# Patient Record
Sex: FEMALE | Race: WHITE
[De-identification: names, ages, dates, MRNs, and addresses within clinical notes are randomized per-mention and may not be internally consistent; named-entity substitution may affect disease eponyms.]

---

## 2018-01-20 NOTE — EKG
Date Performed: 01/19/2018       Time Performed: 13:35:14

 

PTAGE:      62 years

 

EKG:      Sinus rhythm 

 

. Normal ECG 

 

NO PREVIOUS TRACING            

 

DOCTOR:   Rainer Hussein  Interpretating Date/Time  01/20/2018 13:00:20

## 2018-02-01 ENCOUNTER — HOSPITAL ENCOUNTER (OUTPATIENT)
Dept: HOSPITAL 17 - HSDC | Age: 63
Setting detail: OBSERVATION
LOS: 1 days | Discharge: HOME | End: 2018-02-02
Attending: OBSTETRICS & GYNECOLOGY | Admitting: OBSTETRICS & GYNECOLOGY
Payer: COMMERCIAL

## 2018-02-01 VITALS
TEMPERATURE: 98.2 F | OXYGEN SATURATION: 97 % | RESPIRATION RATE: 20 BRPM | HEART RATE: 84 BPM | DIASTOLIC BLOOD PRESSURE: 72 MMHG | SYSTOLIC BLOOD PRESSURE: 141 MMHG

## 2018-02-01 VITALS
OXYGEN SATURATION: 90 % | TEMPERATURE: 97.2 F | DIASTOLIC BLOOD PRESSURE: 71 MMHG | RESPIRATION RATE: 18 BRPM | SYSTOLIC BLOOD PRESSURE: 147 MMHG | HEART RATE: 90 BPM

## 2018-02-01 VITALS
SYSTOLIC BLOOD PRESSURE: 139 MMHG | HEART RATE: 82 BPM | OXYGEN SATURATION: 98 % | TEMPERATURE: 97.9 F | DIASTOLIC BLOOD PRESSURE: 92 MMHG | RESPIRATION RATE: 18 BRPM

## 2018-02-01 VITALS — HEIGHT: 60 IN | BODY MASS INDEX: 34.5 KG/M2 | WEIGHT: 175.71 LBS

## 2018-02-01 DIAGNOSIS — C54.1: Primary | ICD-10-CM

## 2018-02-01 PROCEDURE — 00840 ANES IPER PX LOWER ABD NOS: CPT

## 2018-02-01 PROCEDURE — 96374 THER/PROPH/DIAG INJ IV PUSH: CPT

## 2018-02-01 PROCEDURE — 96361 HYDRATE IV INFUSION ADD-ON: CPT

## 2018-02-01 PROCEDURE — 86900 BLOOD TYPING SEROLOGIC ABO: CPT

## 2018-02-01 PROCEDURE — 88329 PATH CONSLTJ DRG SURG: CPT

## 2018-02-01 PROCEDURE — 85025 COMPLETE CBC W/AUTO DIFF WBC: CPT

## 2018-02-01 PROCEDURE — 88112 CYTOPATH CELL ENHANCE TECH: CPT

## 2018-02-01 PROCEDURE — 88331 PATH CONSLTJ SURG 1 BLK 1SPC: CPT

## 2018-02-01 PROCEDURE — 88309 TISSUE EXAM BY PATHOLOGIST: CPT

## 2018-02-01 PROCEDURE — 86850 RBC ANTIBODY SCREEN: CPT

## 2018-02-01 PROCEDURE — 58552 LAPARO-VAG HYST INCL T/O: CPT

## 2018-02-01 PROCEDURE — 86901 BLOOD TYPING SEROLOGIC RH(D): CPT

## 2018-02-01 PROCEDURE — 94150 VITAL CAPACITY TEST: CPT

## 2018-02-01 PROCEDURE — G0378 HOSPITAL OBSERVATION PER HR: HCPCS

## 2018-02-01 PROCEDURE — 88307 TISSUE EXAM BY PATHOLOGIST: CPT

## 2018-02-01 PROCEDURE — 80048 BASIC METABOLIC PNL TOTAL CA: CPT

## 2018-02-01 PROCEDURE — 96375 TX/PRO/DX INJ NEW DRUG ADDON: CPT

## 2018-02-01 PROCEDURE — 96376 TX/PRO/DX INJ SAME DRUG ADON: CPT

## 2018-02-01 PROCEDURE — 96372 THER/PROPH/DIAG INJ SC/IM: CPT

## 2018-02-01 RX ADMIN — Medication SCH ML: at 10:45

## 2018-02-01 RX ADMIN — OXYCODONE HYDROCHLORIDE AND ACETAMINOPHEN PRN TAB: 5; 325 TABLET ORAL at 17:34

## 2018-02-01 RX ADMIN — ONDANSETRON PRN MG: 2 INJECTION, SOLUTION INTRAMUSCULAR; INTRAVENOUS at 18:04

## 2018-02-01 RX ADMIN — POTASSIUM CHLORIDE, DEXTROSE MONOHYDRATE AND SODIUM CHLORIDE SCH MLS/HR: 150; 5; 450 INJECTION, SOLUTION INTRAVENOUS at 20:25

## 2018-02-01 RX ADMIN — CLONIDINE HYDROCHLORIDE SCH MG: 0.1 INJECTION, SOLUTION EPIDURAL at 17:29

## 2018-02-01 RX ADMIN — CLONIDINE HYDROCHLORIDE SCH MG: 0.1 INJECTION, SOLUTION EPIDURAL at 11:15

## 2018-02-01 RX ADMIN — HEPARIN SODIUM SCH UNITS: 10000 INJECTION, SOLUTION INTRAVENOUS; SUBCUTANEOUS at 14:11

## 2018-02-01 RX ADMIN — OXYCODONE HYDROCHLORIDE AND ACETAMINOPHEN PRN TAB: 5; 325 TABLET ORAL at 22:40

## 2018-02-01 RX ADMIN — Medication SCH ML: at 20:26

## 2018-02-01 RX ADMIN — POTASSIUM CHLORIDE, DEXTROSE MONOHYDRATE AND SODIUM CHLORIDE SCH MLS/HR: 150; 5; 450 INJECTION, SOLUTION INTRAVENOUS at 11:15

## 2018-02-01 RX ADMIN — HEPARIN SODIUM SCH UNITS: 10000 INJECTION, SOLUTION INTRAVENOUS; SUBCUTANEOUS at 22:31

## 2018-02-01 RX ADMIN — OXYCODONE HYDROCHLORIDE AND ACETAMINOPHEN PRN TAB: 5; 325 TABLET ORAL at 12:12

## 2018-02-01 RX ADMIN — CLONIDINE HYDROCHLORIDE SCH MG: 0.1 INJECTION, SOLUTION EPIDURAL at 22:32

## 2018-02-01 NOTE — PD.ONC.PN
Subjective


Subjective


Remarks


post op note:





patient is resting in bed 


denies nausea and vomiting


has taken Percocet for pain





Objective


Data











  Date Time  Temp Pulse Resp B/P (MAP) Pulse Ox O2 Delivery O2 Flow Rate FiO2


 


2/1/18 14:13   16     


 


2/1/18 13:32 97.9 82 18 139/92 (108) 98   


 


2/1/18 12:30  78 16 111/65 (80) 97 Nasal Cannula 2 


 


2/1/18 12:00  80 16 105/57 (73) 96 Nasal Cannula 2 


 


2/1/18 11:45  80 16 112/64 (80) 96 Nasal Cannula 2 


 


2/1/18 11:30  82 16 121/63 (82) 95 Nasal Cannula 2 


 


2/1/18 11:15  80 16 135/68 (90) 96 Nasal Cannula 2 


 


2/1/18 11:00  82 16 139/68 (91) 96 Nasal Cannula 2 


 


2/1/18 10:45  90 16 120/69 (86) 94 Nasal Cannula 2 


 


2/1/18 10:39 98.3 88 16 139/74 (95) 94 Nasal Cannula 2 


 


2/1/18 06:22 98.8 74 18 126/68 (87) 95   














 2/1/18 2/1/18 2/1/18





 07:00 15:00 23:00


 


Intake Total  1200 ml 


 


Output Total  260 ml 


 


Balance  940 ml 











Administered Medications








 Medications


  (Trade)  Dose


 Ordered  Sig/Lizzie


 Route


 PRN Reason  Start Time


 Stop Time Status Last Admin


Dose Admin


 


 Lactated Ringer's  1,000 ml @ 


 30 mls/hr  Q24H PRN


 IV


 SEE LABEL COMMENTS  2/1/18 06:00


 2/4/18 05:59  2/1/18 06:30


 


 


 Povidone Iodine


  (Betadine 5%


 Antisepsis Kit)  1 applic  ON CALL  PRN


 EACH NARE


 SEE LABEL COMMENTS  2/1/18 06:00


 2/4/18 05:59  2/1/18 06:42


 


 


 Chlorhexidine


 Gluconate


  (Chlorhexidine


 2% Cloth)  3 pack  ON CALL  PRN


 TOPICAL


 SEE LABEL COMMENTS  2/1/18 06:00


 2/4/18 05:59  2/1/18 06:00


 


 


 Heparin Sodium


  (Porcine)


  (Heparin Inj)  5,000 units  ON CALL  PRN


 SQ


 GIVE ON CALL TO OR  2/1/18 06:00


    2/1/18 06:35


 


 


 Sodium Chloride


  (NS Flush)  2 ml  BID


 IV FLUSH


   2/1/18 09:00


    2/1/18 10:45


 


 


 Potassium


 Chloride/Dextrose/


 Sod Cl  1,000 ml @ 


 100 mls/hr  Q10H


 IV


   2/1/18 10:31


    2/1/18 11:15


 


 


 Ketorolac


 Tromethamine


  (Toradol Inj)  15 mg  Q6H


 IVP


   2/1/18 11:00


 2/2/18 05:01  2/1/18 11:15


 


 


 Oxycodone/


 Acetaminophen


  (Percocet  5-325


 Mg)  1 tab  Q4H  PRN


 PO


 PAIN SCALE 1 TO 5  2/1/18 10:45


    2/1/18 12:12


 


 


 Heparin Sodium


  (Porcine)


  (Heparin Inj)  5,000 units  Q8H


 SQ


   2/1/18 15:00


    2/1/18 14:11


 








Objective Remarks


GENERAL: Well-nourished, well-developed patient.


SKIN: Warm and dry.


HEAD: Normocephalic., mild facial swelling


EYES: No scleral icterus. No injection or drainage. 


CARDIOVASCULAR: Regular rate and rhythm without murmurs. 


RESPIRATORY: Breath sounds equal bilaterally. No accessory muscle use.


GASTROINTESTINAL: SS are c/d/i 


EXTREMITIES: No cyanosis, or edema. 


MUSCULOSKELETAL: Adequate muscle tone.


NEUROLOGICAL: No obvious focal deficit. Awake, alert, and oriented x3.


PSYCHIATRIC: Appropriate mood and affect; insight and judgment normal.





Assessment/Plan


Problem List:  


(1) Post-operative state


ICD Codes:  Z98.890 - Other specified postprocedural states


Plan:  s/p RA lap hyst with bso for endometrial cancer





post op orders in chart


OOB to chair


IS to bedside


ADAT


Percocet and Toradol for pain


solis to be discontinued in the morning and 


anticipate discharge in next 24 hours














Mar Henriquez Feb 1, 2018 15:19

## 2018-02-02 VITALS
SYSTOLIC BLOOD PRESSURE: 124 MMHG | RESPIRATION RATE: 16 BRPM | TEMPERATURE: 97.3 F | OXYGEN SATURATION: 93 % | HEART RATE: 74 BPM | DIASTOLIC BLOOD PRESSURE: 61 MMHG

## 2018-02-02 VITALS
HEART RATE: 80 BPM | SYSTOLIC BLOOD PRESSURE: 125 MMHG | RESPIRATION RATE: 20 BRPM | TEMPERATURE: 96.3 F | OXYGEN SATURATION: 92 % | DIASTOLIC BLOOD PRESSURE: 62 MMHG

## 2018-02-02 VITALS
RESPIRATION RATE: 20 BRPM | HEART RATE: 80 BPM | TEMPERATURE: 97.4 F | SYSTOLIC BLOOD PRESSURE: 147 MMHG | DIASTOLIC BLOOD PRESSURE: 67 MMHG | OXYGEN SATURATION: 92 %

## 2018-02-02 LAB
BASOPHILS # BLD AUTO: 0 TH/MM3 (ref 0–0.2)
BASOPHILS NFR BLD: 0.1 % (ref 0–2)
BUN SERPL-MCNC: 12 MG/DL (ref 7–18)
CALCIUM SERPL-MCNC: 8.7 MG/DL (ref 8.5–10.1)
CHLORIDE SERPL-SCNC: 103 MEQ/L (ref 98–107)
CREAT SERPL-MCNC: 0.73 MG/DL (ref 0.5–1)
EOSINOPHIL # BLD: 0 TH/MM3 (ref 0–0.4)
EOSINOPHIL NFR BLD: 0 % (ref 0–4)
ERYTHROCYTE [DISTWIDTH] IN BLOOD BY AUTOMATED COUNT: 13.1 % (ref 11.6–17.2)
GFR SERPLBLD BASED ON 1.73 SQ M-ARVRAT: 81 ML/MIN (ref 89–?)
GLUCOSE SERPL-MCNC: 109 MG/DL (ref 74–106)
HCO3 BLD-SCNC: 24.4 MEQ/L (ref 21–32)
HCT VFR BLD CALC: 42.2 % (ref 35–46)
HGB BLD-MCNC: 14.2 GM/DL (ref 11.6–15.3)
LYMPHOCYTES # BLD AUTO: 1.1 TH/MM3 (ref 1–4.8)
LYMPHOCYTES NFR BLD AUTO: 7.3 % (ref 9–44)
MCH RBC QN AUTO: 31.8 PG (ref 27–34)
MCHC RBC AUTO-ENTMCNC: 33.7 % (ref 32–36)
MCV RBC AUTO: 94.5 FL (ref 80–100)
MONOCYTE #: 0.6 TH/MM3 (ref 0–0.9)
MONOCYTES NFR BLD: 4.4 % (ref 0–8)
NEUTROPHILS # BLD AUTO: 12.9 TH/MM3 (ref 1.8–7.7)
NEUTROPHILS NFR BLD AUTO: 88.2 % (ref 16–70)
PLATELET # BLD: 235 TH/MM3 (ref 150–450)
PMV BLD AUTO: 8.5 FL (ref 7–11)
RBC # BLD AUTO: 4.47 MIL/MM3 (ref 4–5.3)
SODIUM SERPL-SCNC: 137 MEQ/L (ref 136–145)
WBC # BLD AUTO: 14.6 TH/MM3 (ref 4–11)

## 2018-02-02 RX ADMIN — OXYCODONE HYDROCHLORIDE AND ACETAMINOPHEN PRN TAB: 5; 325 TABLET ORAL at 10:44

## 2018-02-02 RX ADMIN — HEPARIN SODIUM SCH UNITS: 10000 INJECTION, SOLUTION INTRAVENOUS; SUBCUTANEOUS at 06:04

## 2018-02-02 RX ADMIN — OXYCODONE HYDROCHLORIDE AND ACETAMINOPHEN PRN TAB: 5; 325 TABLET ORAL at 06:20

## 2018-02-02 RX ADMIN — CLONIDINE HYDROCHLORIDE SCH MG: 0.1 INJECTION, SOLUTION EPIDURAL at 06:04

## 2018-02-02 RX ADMIN — ONDANSETRON PRN MG: 2 INJECTION, SOLUTION INTRAMUSCULAR; INTRAVENOUS at 06:04

## 2018-02-02 RX ADMIN — POTASSIUM CHLORIDE, DEXTROSE MONOHYDRATE AND SODIUM CHLORIDE SCH MLS/HR: 150; 5; 450 INJECTION, SOLUTION INTRAVENOUS at 06:04

## 2018-02-02 RX ADMIN — Medication SCH ML: at 09:00

## 2018-02-02 NOTE — PD.ONC.PN
Subjective


Subjective


Remarks


Late note entry


Patient seen at bedside at 9 AM.  


She is s/p surgery. 


Nausea improved this morning.





Objective


Data











  Date Time  Temp Pulse Resp B/P (MAP) Pulse Ox O2 Delivery O2 Flow Rate FiO2


 


2/2/18 08:00 97.3 74 16 124/61 (82) 93   


 


2/2/18 06:09 96.3 80 20 125/62 (83) 92   


 


2/2/18 00:00 97.4 80 20 147/67 (93) 92   


 


2/1/18 20:00 98.2 84 20 141/72 (95) 97   














 2/2/18 2/2/18 2/2/18





 07:00 15:00 23:00


 


Intake Total 1000 ml 300 ml 


 


Output Total 1200 ml 300 ml 


 


Balance -200 ml 0 ml 








Result Diagram:  


2/2/18 0350                                                                    

            2/2/18 0350





Laboratory Results





Laboratory Tests








Test


  2/2/18


03:50


 


White Blood Count 14.6 TH/MM3 


 


Red Blood Count 4.47 MIL/MM3 


 


Hemoglobin 14.2 GM/DL 


 


Hematocrit 42.2 % 


 


Mean Corpuscular Volume 94.5 FL 


 


Mean Corpuscular Hemoglobin 31.8 PG 


 


Mean Corpuscular Hemoglobin


Concent 33.7 % 


 


 


Red Cell Distribution Width 13.1 % 


 


Platelet Count 235 TH/MM3 


 


Mean Platelet Volume 8.5 FL 


 


Neutrophils (%) (Auto) 88.2 % 


 


Lymphocytes (%) (Auto) 7.3 % 


 


Monocytes (%) (Auto) 4.4 % 


 


Eosinophils (%) (Auto) 0.0 % 


 


Basophils (%) (Auto) 0.1 % 


 


Neutrophils # (Auto) 12.9 TH/MM3 


 


Lymphocytes # (Auto) 1.1 TH/MM3 


 


Monocytes # (Auto) 0.6 TH/MM3 


 


Eosinophils # (Auto) 0.0 TH/MM3 


 


Basophils # (Auto) 0.0 TH/MM3 


 


CBC Comment DIFF FINAL 


 


Differential Comment  


 


Blood Urea Nitrogen 12 MG/DL 


 


Creatinine 0.73 MG/DL 


 


Random Glucose 109 MG/DL 


 


Calcium Level 8.7 MG/DL 


 


Sodium Level 137 MEQ/L 


 


Potassium Level 4.8 MEQ/L 


 


Chloride Level 103 MEQ/L 


 


Carbon Dioxide Level 24.4 MEQ/L 


 


Anion Gap 10 MEQ/L 


 


Estimat Glomerular Filtration


Rate 81 ML/MIN 


 








Objective Remarks


GENERAL: Well-nourished, well-developed patient.


RESPIRATORY:  No accessory muscle use.


GASTROINTESTINAL: Abdomen soft, non-tender, nondistended. 


NEUROLOGICAL: No obvious focal deficit. Awake, alert, and oriented x3.


PSYCHIATRIC: Appropriate mood and affect; insight and judgment normal.





Assessment/Plan


Assessment


1.  History of PE and now s/p hysterectomy: will continue with aggressive VTE 

ppx.  She will go home with lovenox 40 mg subq daily x 7 days.  Creatinine 

clearance greater than 30.











Diana Haque MD Feb 2, 2018 20:03

## 2018-02-02 NOTE — MP
cc:

MILAGROS FRENCH MD,LACY MARTIN,KIMMIE MILNER M.D.

****

 

 

DATE OF SURGERY

2/1/2018

 

PREOPERATIVE DIAGNOSIS

Grade 2 endometrial adenocarcinoma.

 

POSTOPERATIVE DIAGNOSIS

Grade 2 endometrial adenocarcinoma.

 

PROCEDURE

Robotic-assisted laparoscopic hysterectomy, bilateral salpingo-oophorectomy.

 

SURGEON

Lacy Sanchez.

 

FIRST ASSISTANT

[place] First Assistant.

 

ANESTHESIA

General endotracheal.

 

ESTIMATED BLOOD LOSS

100 cc.

 

IV FLUIDS

1100 cc.

 

URINE OUTPUT

50 cc.

 

HISTORY

This is a 62-year-old female with postmenopausal bleeding, thickened

endometrial stripe.  Biopsy showed grade 2 endometrial adenocarcinoma.

 

She was counseled regarding options.  She is in favor of definitive surgical

management.  She is seen again in the pre-op  holding area with her family

members where the findings are again reviewed and the plan of care is

discussed.  Questions were asked and answered.  She expressed good

understanding and agrees to move forward with surgery.

 

FINDINGS

Uterine cavity sounded to 6 cm.  Grossly the uterus, tubes and ovaries appeared

normal.  There are no appreciably enlarged pelvic or paraaortic lymph nodes.

 

In the peritoneal cavity the liver and diaphragm edges are smooth.  The

omentum, large and small bowel and adjacent mesentery are grossly normal with

no peritoneal implants.

 

The uterus once removed showed a tumor to be approximately 2.5 cm in greatest

dimension.  It was superficial and polypoid and there was no overt evidence of

myometrial invasion or cervical extension.

 

DETAILS OF PROCEDURE

She was taken to the operating room, placed in dorsal lithotomy position.

After general endotracheal anesthesia was administered a timeout was

undertaken.  She was identified by sight recognition and hospital ID bracelet

and the proposed procedure was reviewed and confirmed.  She was carefully

positioned in padded Candido stirrups.  Her arms were padded and secured to the

sides.  She was further secured to the operating table with eggcrate padding

and tape in an across chest over the shoulder fashion.  All sites were noted to

be properly aligned with no malalignments or pressure points.

 

She was prepped and draped in sterile fashion, placed in lithotomy position.

The cervix was grasped.  The uterine cavity was sounded.  The cervix was

dilated.  A small VCare manipulator was inserted and secured in the usual

fashion.  A Rice catheter was placed in the bladder.  She was returned to low

lithotomy position.  A change of sterile gloves was undertaken and we completed

draping in anticipation of laparoscopy and confirmed that an orogastric tube

was in the stomach and on suction.

 

With manual elevation of the abdominal wall and direct laparoscopic

visualization a 5 mm cannula was introduced into the left upper quadrant.

Carbon dioxide gas was insufflated and an atraumatic entry was confirmed.

 

Under laparoscopic visualization a 12 mm cannula was placed in the midline

above the umbilicus.  An 8 mm cannula was placed in the right upper quadrant

and the left lateral quadrant and the original 5 was exchanged for an 8 mm

cannula.

 

She was placed in Trendelenburg position.  Peritoneal washings were obtained

for cytology.  The anatomy was surveyed with findings as described above.  The

small bowel was folded back on its mesenteric root to the extent possible and

three Ray-Leta sponges were placed around the root of the small bowel

mesentery.

 

The robotic system was brought into the operative field and attached in the

usual fashion.  Monopolar scissors, fenestrated bipolar forceps and ProGrasp

manipulators were placed in arms #1, 2 and 3 respectively and I took my place

at the surgeon's console.

 

The right round ligament was isolated, cauterized and transected.  The anterior

and posterior leafs of the broad ligament were opened.  The right ureter was

identified.  The right infundibulopelvic ligament was isolated.  The

intervening peritoneum was opened.  The infundibulopelvic ligament was elevated

as it was dissected proximally and the ureter was retracted posteriorly.  The

infundibulopelvic ligament was thoroughly cauterized and transected.

 

The posterior peritoneum was opened along the right side of uterus and cervix.

The right vesicouterine peritoneum was dissected off the lower uterine segment

and cervix.  The right uterine vessels were skeletonized, cauterized and

transected as were the cardinal, paracervical and uterosacral ligaments.

 

Attention was directed toward the left side.  Some lysis of adhesions was

performed to mobilize the colon from its attachments to the left pelvic

sidewall.  The left round ligament was isolated, cauterized and transected, and

the anterior and posterior leafs of the broad ligament were further dissected.

 

The left ureter was identified.  The left infundibulopelvic ligament was

isolated.  The intervening peritoneum was opened.  The infundibulopelvic

ligament was dissected to the pelvic brim where it was cauterized and

transected.

 

The posterior peritoneum was opened along the left side of the uterus and

cervix and the left vesicouterine peritoneum dissected off the lower uterine

segment and cervix.  The left uterine vessels were skeletonized, cauterized and

transected as were the cardinal, paracervical and uterosacral ligaments.

 

Colpotomy was performed  the cervix from the upper vagina.  Specimen

was withdrawn transvaginally which included uterus, cervix, tubes and ovaries,

and sent for initial histopathologic analysis.

 

Instruments 1 and 3 were exchanged for needle drivers as 0 Vicryl suture was

introduced.  The vaginal cuff was closed starting at the left corner

full-thickness closure including the posterior peritoneum edge of the

uterosacral ligament tied via instrument tie.  The closure was held on

countertraction as a running continuous full-thickness closure was carried

across the vaginal apex to the contralateral corner where it was similarly

fixed and tied securely.  The needle was cut and removed.

 

The pelvis was thoroughly irrigated.  Small bleeders were rendered hemostatic

with bipolar cautery.  The integrity of the bladder was confirmed by filling

the bladder with saline dyed with methylene blue, distended nicely under

pressure.  There were no weak spots in the bladder, no blue visible, no

extravasation of dye.  There was a good margin between the bladder edge and the

vaginal cuff suture line.  There was good peristalsis of ureters bilaterally

and the bladder was drained.

 

Pathology came back showing a noninvasive tumor.  It was felt that staging

lymphadenectomy would be associated with morbidity that exceeded benefit.  It

was felt that all reasonable surgical objectives had been completed.

 

The robotic instruments were removed.  The robotic system was disengaged from

the operative field.  I reentered the bedside under sterile condition.  Each of

the three Ray-Leta sponges that had been placed were removed laparoscopically.

They were removed through the 12 mm cannula.  Each were inspected and noted to

be removed in their entirety.

 

The 12 mm fascial defect was then closed with interrupted 0 Vicryl sutures

using a needle pass fascial closure apparatus.  They were tied securely which

rendered the fascia completely airtight and hemostatic.

 

The remaining cannulas were withdrawn.  Carbon dioxide gas was removed from the

peritoneal cavity.  3-0 Vicryl subcutaneous, 3-0 Vicryl subcuticular and

Steri-Strips were used to close these incisions.

 

She was returned to dorsal lithotomy position.  Pelvic exam confirmed the

vaginal cuff was well-supported, the suture line was intact.  There were no

vaginal lacerations except one superficial laceration right at the introitus

which was repaired and rendered hemostatic with interrupted 3-0 Vicryl

figure-of-eight sutures.

 

There were no remaining foreign objects in the vagin.  All sites were

hemostatic.  Final counts were correct.  She was returned to dorsal supine

position and was pending reversal of anesthesia when I left the operating room

to precede her to the post-anesthesia care unit.

 

 

                              _________________________________

                              MD AALIYAH Kothari/CARLTON

D:  2/1/2018/11:31 AM

T:  2/2/2018/11:46 AM

Visit #:  [id number]

Job #:  [id number]

## 2018-02-03 ENCOUNTER — HOSPITAL ENCOUNTER (OUTPATIENT)
Dept: HOSPITAL 17 - NEPE | Age: 63
Setting detail: OBSERVATION
LOS: 2 days | Discharge: HOME | End: 2018-02-05
Payer: COMMERCIAL

## 2018-02-03 VITALS
SYSTOLIC BLOOD PRESSURE: 121 MMHG | RESPIRATION RATE: 18 BRPM | OXYGEN SATURATION: 95 % | HEART RATE: 74 BPM | DIASTOLIC BLOOD PRESSURE: 79 MMHG

## 2018-02-03 VITALS
SYSTOLIC BLOOD PRESSURE: 136 MMHG | RESPIRATION RATE: 16 BRPM | TEMPERATURE: 99.2 F | OXYGEN SATURATION: 98 % | HEART RATE: 88 BPM | DIASTOLIC BLOOD PRESSURE: 69 MMHG

## 2018-02-03 VITALS
RESPIRATION RATE: 16 BRPM | SYSTOLIC BLOOD PRESSURE: 147 MMHG | DIASTOLIC BLOOD PRESSURE: 70 MMHG | OXYGEN SATURATION: 92 % | HEART RATE: 87 BPM

## 2018-02-03 VITALS
RESPIRATION RATE: 17 BRPM | SYSTOLIC BLOOD PRESSURE: 135 MMHG | TEMPERATURE: 99 F | DIASTOLIC BLOOD PRESSURE: 70 MMHG | HEART RATE: 81 BPM | OXYGEN SATURATION: 95 %

## 2018-02-03 VITALS
HEART RATE: 88 BPM | DIASTOLIC BLOOD PRESSURE: 70 MMHG | RESPIRATION RATE: 18 BRPM | SYSTOLIC BLOOD PRESSURE: 118 MMHG | TEMPERATURE: 99.2 F | OXYGEN SATURATION: 94 %

## 2018-02-03 VITALS — BODY MASS INDEX: 33.98 KG/M2 | WEIGHT: 173.06 LBS | HEIGHT: 60 IN

## 2018-02-03 VITALS — OXYGEN SATURATION: 95 %

## 2018-02-03 DIAGNOSIS — Z79.01: ICD-10-CM

## 2018-02-03 DIAGNOSIS — R11.0: ICD-10-CM

## 2018-02-03 DIAGNOSIS — E11.9: ICD-10-CM

## 2018-02-03 DIAGNOSIS — M19.90: ICD-10-CM

## 2018-02-03 DIAGNOSIS — I10: ICD-10-CM

## 2018-02-03 DIAGNOSIS — R50.82: Primary | ICD-10-CM

## 2018-02-03 DIAGNOSIS — F32.9: ICD-10-CM

## 2018-02-03 DIAGNOSIS — R09.02: ICD-10-CM

## 2018-02-03 DIAGNOSIS — Z86.711: ICD-10-CM

## 2018-02-03 DIAGNOSIS — F41.9: ICD-10-CM

## 2018-02-03 DIAGNOSIS — Z85.42: ICD-10-CM

## 2018-02-03 DIAGNOSIS — R10.9: ICD-10-CM

## 2018-02-03 DIAGNOSIS — Z79.899: ICD-10-CM

## 2018-02-03 DIAGNOSIS — Z90.710: ICD-10-CM

## 2018-02-03 DIAGNOSIS — K76.0: ICD-10-CM

## 2018-02-03 LAB
ALBUMIN SERPL-MCNC: 3.3 GM/DL (ref 3.4–5)
ALP SERPL-CCNC: 81 U/L (ref 45–117)
ALT SERPL-CCNC: 53 U/L (ref 10–53)
AST SERPL-CCNC: 26 U/L (ref 15–37)
BACTERIA #/AREA URNS HPF: (no result) /HPF
BASOPHILS # BLD AUTO: 0.1 TH/MM3 (ref 0–0.2)
BASOPHILS NFR BLD: 0.5 % (ref 0–2)
BILIRUB SERPL-MCNC: 0.4 MG/DL (ref 0.2–1)
BUN SERPL-MCNC: 13 MG/DL (ref 7–18)
CALCIUM SERPL-MCNC: 8.7 MG/DL (ref 8.5–10.1)
CHLORIDE SERPL-SCNC: 101 MEQ/L (ref 98–107)
COLOR UR: YELLOW
CREAT SERPL-MCNC: 0.77 MG/DL (ref 0.5–1)
EOSINOPHIL # BLD: 0.2 TH/MM3 (ref 0–0.4)
EOSINOPHIL NFR BLD: 1.5 % (ref 0–4)
ERYTHROCYTE [DISTWIDTH] IN BLOOD BY AUTOMATED COUNT: 13.3 % (ref 11.6–17.2)
GFR SERPLBLD BASED ON 1.73 SQ M-ARVRAT: 76 ML/MIN (ref 89–?)
GLUCOSE SERPL-MCNC: 99 MG/DL (ref 74–106)
GLUCOSE UR STRIP-MCNC: (no result) MG/DL
HCO3 BLD-SCNC: 32 MEQ/L (ref 21–32)
HCT VFR BLD CALC: 40 % (ref 35–46)
HGB BLD-MCNC: 13.6 GM/DL (ref 11.6–15.3)
HGB UR QL STRIP: (no result)
INR PPP: 1 RATIO
KETONES UR STRIP-MCNC: (no result) MG/DL
LYMPHOCYTES # BLD AUTO: 2 TH/MM3 (ref 1–4.8)
LYMPHOCYTES NFR BLD AUTO: 17 % (ref 9–44)
MCH RBC QN AUTO: 32.1 PG (ref 27–34)
MCHC RBC AUTO-ENTMCNC: 34 % (ref 32–36)
MCV RBC AUTO: 94.5 FL (ref 80–100)
MONOCYTE #: 1 TH/MM3 (ref 0–0.9)
MONOCYTES NFR BLD: 8.9 % (ref 0–8)
MUCOUS THREADS #/AREA URNS LPF: (no result) /LPF
NEUTROPHILS # BLD AUTO: 8.5 TH/MM3 (ref 1.8–7.7)
NEUTROPHILS NFR BLD AUTO: 72.1 % (ref 16–70)
NITRITE UR QL STRIP: (no result)
PLATELET # BLD: 291 TH/MM3 (ref 150–450)
PMV BLD AUTO: 7.9 FL (ref 7–11)
PROT SERPL-MCNC: 7.2 GM/DL (ref 6.4–8.2)
PROTHROMBIN TIME: 10.1 SEC (ref 9.8–11.6)
RBC # BLD AUTO: 4.23 MIL/MM3 (ref 4–5.3)
SODIUM SERPL-SCNC: 138 MEQ/L (ref 136–145)
SP GR UR STRIP: 1.02 (ref 1–1.03)
SQUAMOUS #/AREA URNS HPF: 1 /HPF (ref 0–5)
URINE LEUKOCYTE ESTERASE: (no result)
WBC # BLD AUTO: 11.7 TH/MM3 (ref 4–11)

## 2018-02-03 PROCEDURE — 94664 DEMO&/EVAL PT USE INHALER: CPT

## 2018-02-03 PROCEDURE — A9567 TECHNETIUM TC-99M AEROSOL: HCPCS

## 2018-02-03 PROCEDURE — 99285 EMERGENCY DEPT VISIT HI MDM: CPT

## 2018-02-03 PROCEDURE — 87804 INFLUENZA ASSAY W/OPTIC: CPT

## 2018-02-03 PROCEDURE — 96361 HYDRATE IV INFUSION ADD-ON: CPT

## 2018-02-03 PROCEDURE — 71045 X-RAY EXAM CHEST 1 VIEW: CPT

## 2018-02-03 PROCEDURE — 96366 THER/PROPH/DIAG IV INF ADDON: CPT

## 2018-02-03 PROCEDURE — 85730 THROMBOPLASTIN TIME PARTIAL: CPT

## 2018-02-03 PROCEDURE — 71046 X-RAY EXAM CHEST 2 VIEWS: CPT

## 2018-02-03 PROCEDURE — 97161 PT EVAL LOW COMPLEX 20 MIN: CPT

## 2018-02-03 PROCEDURE — 85610 PROTHROMBIN TIME: CPT

## 2018-02-03 PROCEDURE — 85379 FIBRIN DEGRADATION QUANT: CPT

## 2018-02-03 PROCEDURE — A9540 TC99M MAA: HCPCS

## 2018-02-03 PROCEDURE — 83690 ASSAY OF LIPASE: CPT

## 2018-02-03 PROCEDURE — 94618 PULMONARY STRESS TESTING: CPT

## 2018-02-03 PROCEDURE — 81001 URINALYSIS AUTO W/SCOPE: CPT

## 2018-02-03 PROCEDURE — G0378 HOSPITAL OBSERVATION PER HR: HCPCS

## 2018-02-03 PROCEDURE — 96365 THER/PROPH/DIAG IV INF INIT: CPT

## 2018-02-03 PROCEDURE — 94640 AIRWAY INHALATION TREATMENT: CPT

## 2018-02-03 PROCEDURE — 87040 BLOOD CULTURE FOR BACTERIA: CPT

## 2018-02-03 PROCEDURE — 80053 COMPREHEN METABOLIC PANEL: CPT

## 2018-02-03 PROCEDURE — 87086 URINE CULTURE/COLONY COUNT: CPT

## 2018-02-03 PROCEDURE — 78582 LUNG VENTILAT&PERFUS IMAGING: CPT

## 2018-02-03 PROCEDURE — 74177 CT ABD & PELVIS W/CONTRAST: CPT

## 2018-02-03 PROCEDURE — 85025 COMPLETE CBC W/AUTO DIFF WBC: CPT

## 2018-02-03 PROCEDURE — 96367 TX/PROPH/DG ADDL SEQ IV INF: CPT

## 2018-02-03 PROCEDURE — 94150 VITAL CAPACITY TEST: CPT

## 2018-02-03 PROCEDURE — 83605 ASSAY OF LACTIC ACID: CPT

## 2018-02-03 PROCEDURE — 96372 THER/PROPH/DIAG INJ SC/IM: CPT

## 2018-02-03 PROCEDURE — 96376 TX/PRO/DX INJ SAME DRUG ADON: CPT

## 2018-02-03 PROCEDURE — 96375 TX/PRO/DX INJ NEW DRUG ADDON: CPT

## 2018-02-03 RX ADMIN — Medication SCH ML: at 21:00

## 2018-02-03 RX ADMIN — ENOXAPARIN SODIUM SCH MG: 40 INJECTION SUBCUTANEOUS at 20:53

## 2018-02-03 RX ADMIN — STANDARDIZED SENNA CONCENTRATE AND DOCUSATE SODIUM SCH TAB: 8.6; 5 TABLET, FILM COATED ORAL at 21:02

## 2018-02-03 RX ADMIN — Medication SCH ML: at 21:02

## 2018-02-03 NOTE — RADRPT
EXAM DATE/TIME:  02/03/2018 17:24 

 

[facility] COMPARISON:     

No previous studies available for comparison.

 

                     

INDICATIONS :     

Fever.

                     

 

MEDICAL HISTORY :     

Hypertension.          

 

SURGICAL HISTORY :     

Hysterectomy.   

 

ENCOUNTER:     

Initial                                        

 

ACUITY:     

1 day      

 

PAIN SCORE:     

0/10

 

LOCATION:     

Bilateral chest 

 

FINDINGS:     

Slight bibasilar atelectasis and/or infiltrate is seen. Heart and mediastinum are unremarkable for te
chnique.

 

CONCLUSION:     

Slight bibasilar atelectasis and/or infiltrate is seen worse on the left.

 

 

 

 MARGARITA Rodarte MD on February 03, 2018 at 17:48           

Board Certified Radiologist.

 This report was verified electronically.

## 2018-02-03 NOTE — PD
HPI


Chief Complaint:  Cold / Flu Symptoms


Time Seen by Provider:  17:13


Travel History


International Travel<30 days:  No


Contact w/Intl Traveler<30days:  No


Traveled to known affect area:  No





History of Present Illness


HPI


62 year old female presents to the emergency department for evaluation of 

fever.  Patient has hysterectomy by Dr. Valdes on Thursday, 2 days ago, for 

endometrial cancer.  She states she had a fever today up to 100.9.  She is on 

oxycodone for pain which she last took at 3 PM.  She also is on Lovenox due to 

history of blood clots.  Patient reports abdominal pain, 7/10, sharp and achy 

without radiation.  She denies any cough, congestion.  No chest pain or 

shortness of breath.  She reports nausea, but no vomiting.  She reports gas 

pain and has not had a bowel movement since she had the surgery done.  She is 

not currently undergoing chemotherapy or radiation therapy.  Moderate severity.

  Oxycodone will help alleviate the pain.  No exacerbating factors.





PFSH


Past Medical History


Depression:  Yes


Cancer:  Yes (endometrial)


Cardiovascular Problems:  Yes


Diabetes:  No


Diminished Hearing:  No


Endocrine:  No


Genitourinary:  No


Hepatitis:  No


Hiatal Hernia:  No


Immune Disorder:  No


Musculoskeletal:  Yes (arthritis)


Neurologic:  No


Psychiatric:  Yes (depression)


Reproductive:  Yes (endometrial cancer)


Respiratory:  Yes (prev hx of blood clots in lungs)


Thyroid Disease:  No





Past Surgical History


Abdominal Surgery:  No


AICD:  No


Cardiac Surgery:  No


Ear Surgery:  No


Endocrine Surgery:  No


Eye Surgery:  No


Genitourinary Surgery:  No


Gynecologic Surgery:  No


Joint Replacement:  No


Oral Surgery:  No


Pacemaker:  No


Thoracic Surgery:  No





Social History


Alcohol Use:  Yes (OCCASIONAL DRIKN)


Tobacco Use:  No


Substance Use:  No





Allergies-Medications


(Allergen,Severity, Reaction):  


Coded Allergies:  


     succinylcholine (Verified  Allergy, Severe, has pseudocholinesterase 

deficiency, 2/3/18)


Reported Meds & Prescriptions





Reported Meds & Active Scripts


Active


Oxycodone-Acetaminophen 5-325 (Oxycodone HCl/Acetaminophen) 5 Mg-325 Mg Tablet 

1 Tab PO Q4H PRN


Reported


Vitamin D3 (Cholecalciferol) 5,000 Unit Cap 5,000 Units PO WEEKLY


Fluoxetine (Fluoxetine HCl) 20 Mg Tab 20 Mg PO DAILY


Amlodipine (Amlodipine Besylate) 5 Mg Tab 5 Mg PO DAILY








Review of Systems


Except as stated in HPI:  all other systems reviewed are Neg





Physical Exam


Narrative


GENERAL: Well-nourished, well-developed female patient, ambulatory.  Afebrile.


SKIN: Focused skin assessment warm/dry.


HEAD: Normocephalic.  Atraumatic.


ENT: Mucosa pink and moist. No erythema or exudates. No uvular edema. No uvular

, palatal, or tonsillar deviation. Airway patent. Nasal turbinates appear 

normal without nasal blood, purulent drainage or septal hematoma.  Bilateral 

tympanic membranes are clear without erythema or perforation.


EYES: No scleral icterus. No injection or drainage. 


NECK: Supple, trachea midline. No JVD or lymphadenopathy.


CARDIOVASCULAR: Regular rate and rhythm without murmurs, gallops, or rubs. 


RESPIRATORY: Breath sounds equal bilaterally. No accessory muscle use.  Lungs 

sounds are clear to auscultation.


GASTROINTESTINAL: Abdomen soft and nondistended.  Patient has 4 small incisions 

to abdomen without drainage or erythema.  Steri-Strips are in place.  She she 

has mild diffuse tenderness to palpation.


MUSCULOSKELETAL: No cyanosis, or edema.  


BACK: Nontender without obvious deformity. No CVA tenderness.





Data


Data


Last Documented VS





Vital Signs








  Date Time  Temp Pulse Resp B/P (MAP) Pulse Ox O2 Delivery O2 Flow Rate FiO2


 


2/3/18 18:00  74 18 121/79 (93) 95 Nasal Cannula 2.00 


 


2/3/18 16:59 99.2       








Orders





 Orders


Complete Blood Count With Diff (2/3/18 17:19)


Comprehensive Metabolic Panel (2/3/18 17:19)


Lipase (2/3/18 17:19)


Prothrombin Time / Inr (Pt) (2/3/18 17:19)


Act Partial Throm Time (Ptt) (2/3/18 17:19)


Urinalysis - C+S If Indicated (2/3/18 17:19)


Ct Abd/Pel W Iv Contrast(Rout) (2/3/18 17:19)


Iv Access Insert/Monitor (2/3/18 17:19)


Ecg Monitoring (2/3/18 17:19)


Oximetry (2/3/18 17:19)


Ondansetron Inj (Zofran Inj) (2/3/18 17:30)


Sodium Chlor 0.9% 1000 Ml Inj (Ns 1000 M (2/3/18 17:19)


Sodium Chloride 0.9% Flush (Ns Flush) (2/3/18 17:30)


Morphine Inj (Morphine Inj) (2/3/18 17:30)


Blood Culture (2/3/18 17:19)


Influenzae A/B Antigen (2/3/18 17:19)


Lactic Acid Sepsis Protocol (2/3/18 17:19)


Chest, Single Ap (2/3/18 )


Urine Culture (2/3/18 17:40)


Iohexol 350 Inj (Omnipaque 350 Inj) (2/3/18 18:41)


Ceftriaxone Inj (Rocephin Inj) (2/3/18 19:15)


Azithromycin Inj (Zithromax Inj) (2/3/18 19:30)


D-Dimer (2/3/18 19:43)


Ventilation & Perfusion Scan (2/3/18 )


Cardiac Monitor / Telemetry MARLEE.Q8H (2/3/18 19:43)


Vital Signs (Adult) Q4H (2/3/18 19:43)


Diet Heart Healthy (2/4/18 Breakfast)


Activity Oob With Assistance (2/3/18 19:43)





Labs





Laboratory Tests








Test


  2/3/18


17:31 2/3/18


17:40 2/3/18


17:42


 


White Blood Count 11.7 TH/MM3   


 


Red Blood Count 4.23 MIL/MM3   


 


Hemoglobin 13.6 GM/DL   


 


Hematocrit 40.0 %   


 


Mean Corpuscular Volume 94.5 FL   


 


Mean Corpuscular Hemoglobin 32.1 PG   


 


Mean Corpuscular Hemoglobin


Concent 34.0 % 


  


  


 


 


Red Cell Distribution Width 13.3 %   


 


Platelet Count 291 TH/MM3   


 


Mean Platelet Volume 7.9 FL   


 


Neutrophils (%) (Auto) 72.1 %   


 


Lymphocytes (%) (Auto) 17.0 %   


 


Monocytes (%) (Auto) 8.9 %   


 


Eosinophils (%) (Auto) 1.5 %   


 


Basophils (%) (Auto) 0.5 %   


 


Neutrophils # (Auto) 8.5 TH/MM3   


 


Lymphocytes # (Auto) 2.0 TH/MM3   


 


Monocytes # (Auto) 1.0 TH/MM3   


 


Eosinophils # (Auto) 0.2 TH/MM3   


 


Basophils # (Auto) 0.1 TH/MM3   


 


CBC Comment DIFF FINAL   


 


Differential Comment    


 


Prothrombin Time 10.1 SEC   


 


Prothromb Time International


Ratio 1.0 RATIO 


  


  


 


 


Activated Partial


Thromboplast Time 29.5 SEC 


  


  


 


 


Blood Urea Nitrogen 13 MG/DL   


 


Creatinine 0.77 MG/DL   


 


Random Glucose 99 MG/DL   


 


Total Protein 7.2 GM/DL   


 


Albumin 3.3 GM/DL   


 


Calcium Level 8.7 MG/DL   


 


Alkaline Phosphatase 81 U/L   


 


Aspartate Amino Transf


(AST/SGOT) 26 U/L 


  


  


 


 


Alanine Aminotransferase


(ALT/SGPT) 53 U/L 


  


  


 


 


Total Bilirubin 0.4 MG/DL   


 


Sodium Level 138 MEQ/L   


 


Potassium Level 3.8 MEQ/L   


 


Chloride Level 101 MEQ/L   


 


Carbon Dioxide Level 32.0 MEQ/L   


 


Anion Gap 5 MEQ/L   


 


Estimat Glomerular Filtration


Rate 76 ML/MIN 


  


  


 


 


Lipase 69 U/L   


 


Urine Color  YELLOW  


 


Urine Turbidity  CLEAR  


 


Urine pH  6.0  


 


Urine Specific Gravity  1.018  


 


Urine Protein  NEG mg/dL  


 


Urine Glucose (UA)  NEG mg/dL  


 


Urine Ketones  NEG mg/dL  


 


Urine Occult Blood  SMALL  


 


Urine Nitrite  NEG  


 


Urine Bilirubin  NEG  


 


Urine Urobilinogen


  


  LESS THAN 2.0


MG/DL 


 


 


Urine Leukocyte Esterase  MOD  


 


Urine RBC  10 /hpf  


 


Urine WBC  9 /hpf  


 


Urine Squamous Epithelial


Cells 


  1 /hpf 


  


 


 


Urine Bacteria  RARE /hpf  


 


Urine Mucus  FEW /lpf  


 


Microscopic Urinalysis Comment


  


  CULTURE


INDICATED 


 


 


Lactic Acid Level   0.5 mmol/L 











[facility]


Medical Decision Making


Medical Screen Exam Complete:  Yes


Emergency Medical Condition:  Yes


Medical Record Reviewed:  Yes


Differential Diagnosis


Postop infection versus influenza versus UTI versus pneumonia versus sepsis


Narrative Course


62-year-old female presents to the emergency department for evaluation of fever 

after having hysterectomy 2 days ago.  IV access established.  CBC, CMP, lipase

, lactic acid, PTT, PT/INR, UA, blood cultures 2, and influenza are ordered 

and pending.  Chest x-ray is ordered and pending.  CT the abdomen/pelvis with 

IV contrast is ordered and pending.  Patient is given morphine 4 mg IV, Zofran 

4 mg IV, normal saline 1 L IV bolus.





CBC shows leukocytosis 11.7.  CMP shows no acute abnormality.  Lipase is 69.  

Lactic acid is 0.5.  Coags are unremarkable.  UA shows moderate leukocyte 

esterase, 9 wbc's.  Influenza is negative.  Chest x-ray shows slight bibasilar 

atelectasis and/or infiltrate is seen, worse on the left.  CT abdomen/pelvis 

shows Postsurgical changes in the pelvis a gas bubble as described above within 

the bladder anterior abdominal wall subcutaneous tissue and the pelvis all 

postsurgical changes. No evidence for abscess; Fatty liver.





When I went to re-examine patient, she just returned to the bathroom and oxygen 

saturation was 81% on room air despite position changes and deep breathing.  

Patient was placed on 3L O2 NC and oxygen saturation went up to 92%.  Patient 

is given Rocephin 1 g IV, azithromycin 500 mg IV.





D-dimer and vq scan are ordered and pending.





[facility] is paged for admission.  Dr. Pederson accepted admission.





Diagnosis





 Primary Impression:  


 Pneumonia


 Qualified Codes:  J18.9 - Pneumonia, unspecified organism


 Additional Impression:  


 Hypoxia





Admitting Information


Admitting Physician Requests:  Admit











Aria Garcia Feb 3, 2018 17:31

## 2018-02-03 NOTE — RADRPT
EXAM DATE/TIME:  02/03/2018 18:40 

 

[facility] COMPARISON:     No previous studies available for comparison.

 

INDICATIONS :     Fever, upper abdominal pain and nausea status post hysterectomy two days ago.

                      

IV CONTRAST:     96 cc Omnipaque 350 (iohexol) IV 

 

ORAL CONTRAST:      No oral contrast ingested.

                      

RADIATION DOSE:     14.77 CTDIvol (mGy) ; Patient body habitus

 

MEDICAL HISTORY :     Hypertension.  endometrial cancer

SURGICAL HISTORY :      Hysterectomy. 

ENCOUNTER:      Initial

ACUITY:      1 day

PAIN SCALE:      8/10

LOCATION:       Bilateral upper quadrant 

 

TECHNIQUE:     Volumetric scanning of the abdomen and pelvis was performed.  Using automated exposure
 control and adjustment of the mA and/or kV according to patient size, radiation dose was kept as low
 as reasonably achievable to obtain optimal diagnostic quality images.  DICOM format image data is av
ailable electronically for review and comparison.  

 

FINDINGS:     

CT Abdomen: The spleen, pancreas, kidneys, adrenals are unremarkable. There is no evidence for any ap
preciable pathological adenopathy, free fluid, or bowel obstruction.  Slight degree of somewhat linea
r irregular opacity is seen in bilateral lung bases mostly consistent with scarring. There are gas bu
bbles in the anterior abdominal wall from recent hysterectomy with haziness in the anterior abdominal
 wall. There is also gas within the bladder probably from prior Rice catheter insertion. A few gas b
ubbles are present and appear to be intraperitoneal within the pelvis also postsurgical change. The l
iver is fatty without focal lesions or technique.

 

CT pelvis: There is no evidence for mass, abscess formation, or any significant adenopathy within the
 pelvis. There is a large lipoma in the left posterior upper thigh measures almost 9.7 cm with hazine
ss of fat planes also postsurgical. There are scattered diverticuli mainly in the sigmoid colon witho
ut definite signs of diverticulitis.

 

CONCLUSION:     

1. Postsurgical changes in the pelvis a gas bubble as described above within the bladder anterior abd
ominal wall subcutaneous tissue and the pelvis all postsurgical changes. No evidence for abscess.

2. Fatty liver.

 

 MARGARITA Rodarte MD on February 03, 2018 at 18:56           

Board Certified Radiologist.

 This report was verified electronically.

## 2018-02-03 NOTE — RADRPT
EXAM DATE/TIME:  02/03/2018 21:36 

 

[facility] COMPARISON:     

CHEST SINGLE AP, February 03, 2018, 17:24.

 

 

INDICATIONS :      

Hystrectomy 2 days ago. History of pulmonary embolism.

                       

 

DOSE:      

0.73 mCi Tc99m DTPA 

                                           8.8 mCi Tc99m MAA 

                       

                       

 

MEDICAL HISTORY :     

Hypertension. Cardiovascular disease  Pulmonary embolism.

 

SURGICAL HISTORY :      

Hysterectomy.    Foot.

 

ENCOUNTER:     

Initial

 

ACUITY:     

1 day

 

PAIN SCALE:     

0/10

 

LOCATION:      

Bilateral chest 

 

TECHNIQUE:     

Following five minutes of tidal breathing of DTPA aerosol, planar images of the lungs were performed 
in eight projections.  The patient was then injected with MAA, and eight-view perfusion scan was perf
ormed.  

 

FINDINGS:     

There is a homogeneous pattern of aerosol delivery to the periphery of both lungs.  No focal ventilat
ory defects are seen.

 

The perfusion lung scan demonstrates a homogenous pattern of uptake in both lungs.  No segmental or s
ubsegmental defects are seen. 

 

CONCLUSION:     Normal examination.  

 

 

 

 MARGARITA Rodarte MD on February 03, 2018 at 22:44           

Board Certified Radiologist.

 This report was verified electronically.

## 2018-02-04 VITALS
HEART RATE: 86 BPM | OXYGEN SATURATION: 95 % | SYSTOLIC BLOOD PRESSURE: 109 MMHG | RESPIRATION RATE: 19 BRPM | DIASTOLIC BLOOD PRESSURE: 57 MMHG | TEMPERATURE: 98.6 F

## 2018-02-04 VITALS
HEART RATE: 81 BPM | SYSTOLIC BLOOD PRESSURE: 149 MMHG | OXYGEN SATURATION: 96 % | RESPIRATION RATE: 18 BRPM | DIASTOLIC BLOOD PRESSURE: 73 MMHG | TEMPERATURE: 98.7 F

## 2018-02-04 VITALS
RESPIRATION RATE: 18 BRPM | OXYGEN SATURATION: 95 % | HEART RATE: 74 BPM | TEMPERATURE: 98.4 F | DIASTOLIC BLOOD PRESSURE: 79 MMHG | SYSTOLIC BLOOD PRESSURE: 168 MMHG

## 2018-02-04 VITALS
SYSTOLIC BLOOD PRESSURE: 138 MMHG | HEART RATE: 85 BPM | DIASTOLIC BLOOD PRESSURE: 63 MMHG | RESPIRATION RATE: 18 BRPM | OXYGEN SATURATION: 92 %

## 2018-02-04 VITALS
SYSTOLIC BLOOD PRESSURE: 112 MMHG | DIASTOLIC BLOOD PRESSURE: 56 MMHG | OXYGEN SATURATION: 87 % | TEMPERATURE: 98.4 F | HEART RATE: 77 BPM | RESPIRATION RATE: 18 BRPM

## 2018-02-04 VITALS — HEART RATE: 84 BPM

## 2018-02-04 VITALS — OXYGEN SATURATION: 97 %

## 2018-02-04 RX ADMIN — Medication SCH ML: at 21:00

## 2018-02-04 RX ADMIN — HYDROCODONE BITARTRATE AND ACETAMINOPHEN PRN TAB: 5; 325 TABLET ORAL at 18:33

## 2018-02-04 RX ADMIN — IPRATROPIUM BROMIDE AND ALBUTEROL SULFATE SCH AMPULE: .5; 3 SOLUTION RESPIRATORY (INHALATION) at 19:31

## 2018-02-04 RX ADMIN — ENOXAPARIN SODIUM SCH MG: 40 INJECTION SUBCUTANEOUS at 22:28

## 2018-02-04 RX ADMIN — ENOXAPARIN SODIUM SCH MG: 40 INJECTION SUBCUTANEOUS at 22:29

## 2018-02-04 RX ADMIN — HYDROCODONE BITARTRATE AND ACETAMINOPHEN PRN TAB: 5; 325 TABLET ORAL at 12:03

## 2018-02-04 RX ADMIN — AZITHROMYCIN SCH MG: 250 TABLET, FILM COATED ORAL at 09:46

## 2018-02-04 RX ADMIN — HYDROCODONE BITARTRATE AND ACETAMINOPHEN PRN TAB: 5; 325 TABLET ORAL at 00:55

## 2018-02-04 RX ADMIN — Medication SCH ML: at 09:00

## 2018-02-04 RX ADMIN — SODIUM CHLORIDE SCH MLS/HR: 900 INJECTION INTRAVENOUS at 09:46

## 2018-02-04 RX ADMIN — STANDARDIZED SENNA CONCENTRATE AND DOCUSATE SODIUM SCH TAB: 8.6; 5 TABLET, FILM COATED ORAL at 22:30

## 2018-02-04 RX ADMIN — HYDROCODONE BITARTRATE AND ACETAMINOPHEN PRN TAB: 5; 325 TABLET ORAL at 07:46

## 2018-02-04 RX ADMIN — STANDARDIZED SENNA CONCENTRATE AND DOCUSATE SODIUM SCH TAB: 8.6; 5 TABLET, FILM COATED ORAL at 09:46

## 2018-02-04 NOTE — MD
cc:

MILAGROS FRENCH MD,LACY MARTIN,KIMMIE GRIMES,DIANA RUTLEDGE MD

****

 

 

 

ADMISSION DATE: 2/1/2018     DISCHARGE DATE:  2/2/2018

 

 

PROCEDURE PERFORMED:

Robotic-assisted laparoscopic hysterectomy and bilateral

salpingo-oophorectomy.

 

DIAGNOSIS:

Endometrial cancer.

 

HOSPITAL COURSE:

She did well in the early postop period hemodynamically stable some early

nausea associated with anesthesia improved by morning of postop day #1

tolerating oral intake. Ins and outs 1200/1460. Labs this morning show

hemoglobin and hematocrit of 14.2/42.2. Electrolytes essentially normal.

Potassium 4.8.  BUN and creatinine 12 and 0.73.

 

PHYSICAL EXAMINATION:

VITAL SIGNS:  Afebrile, pulse 80-90, respirations 16-20, blood pressure

125-147/67-72.  O2 saturations 97% while awake and greater than equal to 92%

while asleep.

GENERAL:  Alert and oriented times three and in no acute distress.

LUNGS: Clear at apices and mild rales at the bases.

CARDIOVASCULAR: Regular rate and rhythm.

ABDOMEN: Soft.  Incisions clean and dry.

 GYN: No bleeding.

EXTREMITIES: Nontender.  No palpable cords.

 

ASSESSMENT:

Postoperative day #1 doing well in early postop.

 

FINDINGS AT THE TIME OF SURGERY:

Preliminary pathology reviewed.

 

ACTIVITIES:

Activities restrictions discussed.

 

Questions were answered.  She expressed good understanding.

 

She understands that with her history of deep vein thrombosis and pulmonary

emboli, we have asked Dr. Diana Grimes, Hematology/Oncology to see her

again and make postop recommendations to reduce the risk of recurrent

thromboembolic problems and she would like to be recommended for Lovenox as one

of her medications at the time of discharge.  Nevertheless, discharge will be

anticipated today, but after recommendations and plans have been set forth by

hematology / oncology.

 

PLAN:

Therefore anticipate discharge to home.

 

She is to resume prior medications and will have a prescription for Percocet

for pain and whatever is recommended and arranged by Dr. Grimes from the

prophylaxis standpoint.

 

She is to contact our office to ensure she has a followup scheduled in two

weeks or to contact us sooner should she have any questions or problems in the

interim.

 

 

 

 

                              _________________________________

                              MD AALIYAH Kothari/INÉS

D:  2/2/2018/7:14 AM

T:  2/4/2018/5:09 PM

Visit #:  [id number]

Job #:  [id number]

## 2018-02-04 NOTE — HHI.HP
HPI


Service


[facility]


Primary Care Physician


Aure aHssan MD


Admission Diagnosis





pneumonia, hypoxia


Chief Complaint:  


fever 100.9 S/P hysterectomy 


Travel History


International Travel<30 Days:  No


Contact w/Intl Traveler <30 Da:  No


Traveled to Known Affected Are:  No


History of Present Illness


This is a 62-year-old female patient with past medical history which includes 

hypertension, pulmonary embolism, DM type 2 diet controlled, anxiety/depression 

and endometrial adenocarcinoma.  Patient is status post robotic-assisted 

laparoscopic hysterectomy, bilateral oophorectomy on 2018 with Dr. Sanchez.  

Given patient's history of pulmonary embolism she was discharged on Lovenox 40 

mg subcutaneous 7 days.  Patient presented to the ER 2/3/2018 for evaluation 

of fever 100.9.  Patient endorses abdominal pain 7 out of 10 sharp and aching 

without radiation.  She reports gas pain and has not had a bowel movement since 

she had the surgery done.  She is not currently undergoing chemotherapy or 

radiation therapy.  Moderate severity.  Oxycodone will help alleviate the pain.

  No exacerbating factors.Patient denies cough congestion shortness of breath 

chills or chest pain.





Review of Systems


Constitutional:  COMPLAINS OF: Fever (100.9 at home)


Respiratory:  COMPLAINS OF: Cough (cough per-op, no longer coughing)


Gastrointestinal:  COMPLAINS OF: Abdominal pain





Past Family Social History


Past Medical History


hypertension, pulmonary embolism, DM type 2 diet controlled, anxiety/depression 

and endometrial adenocarcinoma


Past Surgical History


robotic-assisted laparoscopic hysterectomy, bilateral oophorectomy on 2018 

with Dr. Sanchez


Foot surgery


Reported Medications


Oxycodone-Acetaminophen 5-325 (Oxycodone HCl/Acetaminophen) 5 Mg-325 Mg Tablet 

1 Tab PO Q4H PRN


Vitamin D3 (Cholecalciferol) 5,000 Unit Cap 5,000 Units PO WEEKLY


Fluoxetine (Fluoxetine HCl) 20 Mg Tab 20 Mg PO DAILY


Amlodipine (Amlodipine Besylate) 5 Mg Tab 5 Mg PO DAILY


Allergies:  


Coded Allergies:  


     succinylcholine (Verified  Allergy, Severe, has pseudocholinesterase 

deficiency, 2/3/18)


Family History


Reviewed and noncontributory


Social History


Occasional ETOH use


Denies tobacco use now or in the past





Physical Exam


Vital Signs





Vital Signs








  Date Time  Temp Pulse Resp B/P (MAP) Pulse Ox O2 Delivery O2 Flow Rate FiO2


 


18 11:02 98.7 81 18 149/73 (98) 96   


 


18 07:00 98.4 74 18 168/79 (108) 95   


 


18 00:13  84      


 


2/3/18 23:09 99.2 88 18 118/70 (86) 94   


 


2/3/18 21:22 99.0 81 17 135/70 (91) 95   


 


2/3/18 20:26        


 


2/3/18 20:10     95 Nasal Cannula 3.00 


 


2/3/18 20:05  87 16 147/70 (95) 92 Nasal Cannula 3.00 


 


2/3/18 18:00  74 18 121/79 (93) 95 Nasal Cannula 2.00 


 


2/3/18 17:30     88 Nasal Cannula 2.00 


 


2/3/18 16:59 99.2 88 16 136/69 (91) 98   








Physical Exam


GENERAL: This is a well-nourished, well-developed patient, in no apparent 

distress.


SKIN: post-op abdominal laparoscopic incisions healing no drainage or erythema


HEAD: Atraumatic. Normocephalic. No temporal or scalp tenderness.


EYES:Extraocular motions intact. No scleral icterus. No injection or drainage. 


CARDIOVASCULAR: Regular rate and rhythm 


RESPIRATORY: diminished with poor inspiratory effort


GASTROINTESTINAL: Abdomen soft, tender, nondistended.


MUSCULOSKELETAL: Extremities without clubbing, cyanosis, or edema. No joint 

tenderness, effusion, or edema noted. No calf tenderness. Negative Homans sign 

bilaterally.


NEUROLOGICAL: Awake and alert. No focal deficits noted.  Motor and sensory 

grossly within normal limits. Five out of 5 muscle strength in all muscle 

groups.  Normal speech.


Laboratory





Laboratory Tests








Test


  2/3/18


17:31 2/3/18


17:40 2/3/18


17:42 2/3/18


20:10


 


White Blood Count 11.7    


 


Red Blood Count 4.23    


 


Hemoglobin 13.6    


 


Hematocrit 40.0    


 


Mean Corpuscular Volume 94.5    


 


Mean Corpuscular Hemoglobin 32.1    


 


Mean Corpuscular Hemoglobin


Concent 34.0 


  


  


  


 


 


Red Cell Distribution Width 13.3    


 


Platelet Count 291    


 


Mean Platelet Volume 7.9    


 


Neutrophils (%) (Auto) 72.1    


 


Lymphocytes (%) (Auto) 17.0    


 


Monocytes (%) (Auto) 8.9    


 


Eosinophils (%) (Auto) 1.5    


 


Basophils (%) (Auto) 0.5    


 


Neutrophils # (Auto) 8.5    


 


Lymphocytes # (Auto) 2.0    


 


Monocytes # (Auto) 1.0    


 


Eosinophils # (Auto) 0.2    


 


Basophils # (Auto) 0.1    


 


CBC Comment DIFF FINAL    


 


Differential Comment     


 


Prothrombin Time 10.1    


 


Prothromb Time International


Ratio 1.0 


  


  


  


 


 


Activated Partial


Thromboplast Time 29.5 


  


  


  


 


 


Blood Urea Nitrogen 13    


 


Creatinine 0.77    


 


Random Glucose 99    


 


Total Protein 7.2    


 


Albumin 3.3    


 


Calcium Level 8.7    


 


Alkaline Phosphatase 81    


 


Aspartate Amino Transf


(AST/SGOT) 26 


  


  


  


 


 


Alanine Aminotransferase


(ALT/SGPT) 53 


  


  


  


 


 


Total Bilirubin 0.4    


 


Sodium Level 138    


 


Potassium Level 3.8    


 


Chloride Level 101    


 


Carbon Dioxide Level 32.0    


 


Anion Gap 5    


 


Estimat Glomerular Filtration


Rate 76 


  


  


  


 


 


Lipase 69    


 


Urine Color  YELLOW   


 


Urine Turbidity  CLEAR   


 


Urine pH  6.0   


 


Urine Specific Gravity  1.018   


 


Urine Protein  NEG   


 


Urine Glucose (UA)  NEG   


 


Urine Ketones  NEG   


 


Urine Occult Blood  SMALL   


 


Urine Nitrite  NEG   


 


Urine Bilirubin  NEG   


 


Urine Urobilinogen  LESS THAN 2.0   


 


Urine Leukocyte Esterase  MOD   


 


Urine RBC  10   


 


Urine WBC  9   


 


Urine Squamous Epithelial


Cells 


  1 


  


  


 


 


Urine Bacteria  RARE   


 


Urine Mucus  FEW   


 


Microscopic Urinalysis Comment


  


  CULTURE


INDICATED 


  


 


 


Lactic Acid Level   0.5  


 


D-Dimer Quantitative (PE/DVT)    2.28 














 Date/Time


Source Procedure


Growth Status


 


 


 2/3/18 17:30


Blood Peripheral Aerobic Blood Culture - Preliminary


NO GROWTH IN 1 DAY Resulted


 


 2/3/18 17:30


Blood Peripheral Anaerobic Blood Culture - Preliminary


NO GROWTH IN 1 DAY Resulted


 


 2/3/18 17:35


Nasal Aspirate Influenza Types A,B Antigen (ZOË) - Final


NEGATIVE FOR FLU A AND B ANTIGEN.... Complete


 


 2/3/18 17:40


Urine Random Urine Urine Culture


Pending Received








Result Diagram:  


2/3/18 1731                                                                    

            2/3/18 1731





Imaging





Last Impressions








Abdomen/Pelvis CT 2/3/18 1719 Signed





Impressions: 





 Service Date/Time:  Saturday, February 3, 2018 18:40 - CONCLUSION:  1. 





 Postsurgical changes in the pelvis a gas bubble as described above within the 





 bladder anterior abdominal wall subcutaneous tissue and the pelvis all 





 postsurgical changes. No evidence for abscess. 2. Fatty liver.   MARGARITA Rodarte MD 


 


Lung Scan- Nuclear Medicine 2/3/18 0000 Signed





Impressions: 





 Service Date/Time:  Saturday, February 3, 2018 21:36 - CONCLUSION: Normal 





 examination.       MARGARITA Rodarte MD 


 


Chest X-Ray 2/3/18 0000 Signed





Impressions: 





 Service Date/Time:  Saturday, February 3, 2018 17:24 - CONCLUSION:  Slight 





 bibasilar atelectasis and/or infiltrate is seen worse on the left.     K. Kevin Shamlou, MD Caprini VTE Risk Assessment


Caprini VTE Risk Assessment:  Mod/High Risk (score >= 2)


Caprini Risk Assessment Model











 Point Value = 1          Point Value = 2  Point Value = 3  Point Value = 5


 


Age 41-60


Minor surgery


BMI > 25 kg/m2


Swollen legs


Varicose veins


Pregnancy or postpartum


History of unexplained or recurrent


   spontaneous 


Oral contraceptives or hormone


   replacement


Sepsis (< 1 month)


Serious lung disease, including


   pneumonia (< 1 month)


Abnormal pulmonary function


Acute myocardial infarction


Congestive heart failure (< 1 month)


History of inflammatory bowel disease


Medical patient at bed rest Age 61-74


Arthroscopic surgery


Major open surgery (> 45 min)


Laparoscopic surgery (> 45 min)


Malignancy


Confined to bed (> 72 hours)


Immobilizing plaster cast


Central venous access Age >= 75


History of VTE


Family history of VTE


Factor V Leiden


Prothrombin 84367I


Lupus anticoagulant


Anticardiolipin antibodies


Elevated serum homocysteine


Heparin-induced thrombocytopenia


Other congenital or acquired


   thrombophilia Stroke (< 1 month)


Elective arthroplasty


Hip, pelvis, or leg fracture


Acute spinal cord injury (< 1 month)








Prophylaxis Regimen











   Total Risk


Factor Score Risk Level Prophylaxis Regimen


 


0-1      Low Early ambulation


 


2 Moderate Order ONE of the following:


*Sequential Compression Device (SCD)


*Heparin 5000 units SQ BID


 


3-4 Higher Order ONE of the following medications:


*Heparin 5000 units SQ TID


*Enoxaparin/Lovenox 40 mg SQ daily (WT < 150 kg, CrCl > 30 mL/min)


*Enoxaparin/Lovenox 30 mg SQ daily (WT < 150 kg, CrCl > 10-29 mL/min)


*Enoxaparin/Lovenox 30 mg SQ BID (WT < 150 kg, CrCl > 30 mL/min)


AND/OR


*Sequential Compression Device (SCD)


 


5 or more Highest Order ONE of the following medications:


*Heparin 5000 units SQ TID (Preferred with Epidurals)


*Enoxaparin/Lovenox 40 mg SQ daily (WT < 150 kg, CrCl > 30 mL/min)


*Enoxaparin/Lovenox 30 mg SQ daily (WT < 150 kg, CrCl > 10-29 mL/min)


*Enoxaparin/Lovenox 30 mg SQ BID (WT < 150 kg, CrCl > 30 mL/min)


AND


*Sequential Compression Device (SCD)











Assessment and Plan


Problem List:  


(1) Postoperative fever


ICD Codes:  R50.82 - Postprocedural fever


Plan:  This is a 62-year-old female patient with past medical history which 

includes hypertension, pulmonary embolism, DM type 2 diet controlled, anxiety/

depression and endometrial adenocarcinoma.  Patient is status post robotic-

assisted laparoscopic hysterectomy, bilateral oophorectomy on 2018 with Dr. Sanchez.  Given patient's history of pulmonary embolism she was discharged on 

Lovenox 40 mg subcutaneous 7 days.  Patient presented to the ER 2/3/2018 for 

evaluation of fever 100.9.  Patient endorses abdominal pain 7 out of 10 sharp 

and aching without radiation.  She reports gas pain and has not had a bowel 

movement since she had the surgery done.





- CT abd/Pelvis reviewed Postsurgical changes in the pelvis a gas bubble within 

the bladder anterior abdominal wall subcutaneous tissue and the pelvis all 

postsurgical changes.  No evidence for abscess.  Fatty liver


- D dimmer 2.28 likely reactive post-op


- VQ lung scan Normal exam


- CXR: Slight bibasilar atelectasis and/or infiltrate is seen worse on the left


- ER started IV Rocephin and azithromycin, continued


- BC x2 obtained NGTD


- Flu A and B neg





- Urine analysis reveals: Mod Leukocyte esterase, rare urine bacteria and few 

mucus. 


- continue Rocephin await culture results





(2) Hypoxia


ICD Codes:  R09.02 - Hypoxemia


Status:  Acute


Plan:  - Patient had episode of Pulse oxygen on RA 88%, resolved on oxygen


- titrate oxygen to maintain saturation > or equal to 92%





(3) HTN (hypertension)


ICD Codes:  I10 - Essential (primary) hypertension


Plan:  - Continue patient's home amlodipine 5 mg daily





(4) Anxiety and depression


ICD Codes:  F41.8 - Other specified anxiety disorders


Plan:  - Continue patient's home Fluoxetine 20 mg PO daily





(5) Hx of pulmonary embolus


ICD Codes:  Z86.711 - Personal history of pulmonary embolism


Plan:  - Patient has a history of PE and is now S/P TAHBSO


- patient started on Lovenox 40mg SQ daily


- continue Lovenox 40mg SQ daily for DVT/PE prophylaxis





Assessment and Plan


Patient examined.


Assessment and plan formulated with Ara Milan PA-C.


I agree with the above.











Ara Milan 2018 11:19


Cuong Pederson DO 2018 14:27

## 2018-02-05 VITALS
RESPIRATION RATE: 19 BRPM | HEART RATE: 77 BPM | DIASTOLIC BLOOD PRESSURE: 75 MMHG | OXYGEN SATURATION: 95 % | TEMPERATURE: 97.9 F | SYSTOLIC BLOOD PRESSURE: 136 MMHG

## 2018-02-05 VITALS
RESPIRATION RATE: 18 BRPM | OXYGEN SATURATION: 95 % | DIASTOLIC BLOOD PRESSURE: 73 MMHG | HEART RATE: 69 BPM | TEMPERATURE: 98.5 F | SYSTOLIC BLOOD PRESSURE: 138 MMHG

## 2018-02-05 VITALS — OXYGEN SATURATION: 93 %

## 2018-02-05 RX ADMIN — STANDARDIZED SENNA CONCENTRATE AND DOCUSATE SODIUM SCH TAB: 8.6; 5 TABLET, FILM COATED ORAL at 08:25

## 2018-02-05 RX ADMIN — Medication SCH ML: at 08:26

## 2018-02-05 RX ADMIN — HYDROCODONE BITARTRATE AND ACETAMINOPHEN PRN TAB: 5; 325 TABLET ORAL at 11:32

## 2018-02-05 RX ADMIN — AZITHROMYCIN SCH MG: 250 TABLET, FILM COATED ORAL at 08:25

## 2018-02-05 RX ADMIN — SODIUM CHLORIDE SCH MLS/HR: 900 INJECTION INTRAVENOUS at 08:00

## 2018-02-05 RX ADMIN — IPRATROPIUM BROMIDE AND ALBUTEROL SULFATE SCH AMPULE: .5; 3 SOLUTION RESPIRATORY (INHALATION) at 08:28

## 2018-02-05 RX ADMIN — HYDROCODONE BITARTRATE AND ACETAMINOPHEN PRN TAB: 5; 325 TABLET ORAL at 05:31

## 2018-02-05 NOTE — HHI.PR
Subjective


Remarks


Patient sitting up on the edge of bed eating breakfast


Patient reports abd pain improved 


denies N/V


Patient reports feeling much better, feels ready to go home





Objective


Vitals





Vital Signs








  Date Time  Temp Pulse Resp B/P (MAP) Pulse Ox O2 Delivery O2 Flow Rate FiO2


 


2/5/18 07:32 97.9 77 19 136/75 (95) 95   


 


2/5/18 03:20 98.5 69 18 138/73 (94) 95   


 


2/4/18 23:34 98.4 77 18 112/56 (74) 87   


 


2/4/18 21:38 98.6 86 19 109/57 (74) 95   


 


2/4/18 19:34     97   


 


2/4/18 14:59  85 18 138/63 (88) 92   


 


2/4/18 11:02 98.7 81 18 149/73 (98) 96   








Result Diagram:  


2/3/18 1731                                                                    

            2/3/18 1731





Other Results





 Laboratory Tests








Test


  2/3/18


17:31 2/3/18


17:40 2/3/18


17:42 2/3/18


20:10


 


White Blood Count 11.7 TH/MM3    


 


Red Blood Count 4.23 MIL/MM3    


 


Hemoglobin 13.6 GM/DL    


 


Hematocrit 40.0 %    


 


Mean Corpuscular Volume 94.5 FL    


 


Mean Corpuscular Hemoglobin 32.1 PG    


 


Mean Corpuscular Hemoglobin


Concent 34.0 % 


  


  


  


 


 


Red Cell Distribution Width 13.3 %    


 


Platelet Count 291 TH/MM3    


 


Mean Platelet Volume 7.9 FL    


 


Neutrophils (%) (Auto) 72.1 %    


 


Lymphocytes (%) (Auto) 17.0 %    


 


Monocytes (%) (Auto) 8.9 %    


 


Eosinophils (%) (Auto) 1.5 %    


 


Basophils (%) (Auto) 0.5 %    


 


Neutrophils # (Auto) 8.5 TH/MM3    


 


Lymphocytes # (Auto) 2.0 TH/MM3    


 


Monocytes # (Auto) 1.0 TH/MM3    


 


Eosinophils # (Auto) 0.2 TH/MM3    


 


Basophils # (Auto) 0.1 TH/MM3    


 


CBC Comment DIFF FINAL    


 


Differential Comment     


 


Prothrombin Time 10.1 SEC    


 


Prothromb Time International


Ratio 1.0 RATIO 


  


  


  


 


 


Activated Partial


Thromboplast Time 29.5 SEC 


  


  


  


 


 


Blood Urea Nitrogen 13 MG/DL    


 


Creatinine 0.77 MG/DL    


 


Random Glucose 99 MG/DL    


 


Total Protein 7.2 GM/DL    


 


Albumin 3.3 GM/DL    


 


Calcium Level 8.7 MG/DL    


 


Alkaline Phosphatase 81 U/L    


 


Aspartate Amino Transf


(AST/SGOT) 26 U/L 


  


  


  


 


 


Alanine Aminotransferase


(ALT/SGPT) 53 U/L 


  


  


  


 


 


Total Bilirubin 0.4 MG/DL    


 


Sodium Level 138 MEQ/L    


 


Potassium Level 3.8 MEQ/L    


 


Chloride Level 101 MEQ/L    


 


Carbon Dioxide Level 32.0 MEQ/L    


 


Anion Gap 5 MEQ/L    


 


Estimat Glomerular Filtration


Rate 76 ML/MIN 


  


  


  


 


 


Lipase 69 U/L    


 


Urine Color  YELLOW   


 


Urine Turbidity  CLEAR   


 


Urine pH  6.0   


 


Urine Specific Gravity  1.018   


 


Urine Protein  NEG mg/dL   


 


Urine Glucose (UA)  NEG mg/dL   


 


Urine Ketones  NEG mg/dL   


 


Urine Occult Blood  SMALL   


 


Urine Nitrite  NEG   


 


Urine Bilirubin  NEG   


 


Urine Urobilinogen


  


  LESS THAN 2.0


MG/DL 


  


 


 


Urine Leukocyte Esterase  MOD   


 


Urine RBC  10 /hpf   


 


Urine WBC  9 /hpf   


 


Urine Squamous Epithelial


Cells 


  1 /hpf 


  


  


 


 


Urine Bacteria  RARE /hpf   


 


Urine Mucus  FEW /lpf   


 


Microscopic Urinalysis Comment


  


  CULTURE


INDICATED 


  


 


 


Lactic Acid Level   0.5 mmol/L  


 


D-Dimer Quantitative (PE/DVT)    2.28 MG/L FEU 








Imaging





Last Impressions








Abdomen/Pelvis CT 2/3/18 1719 Signed





Impressions: 





 Service Date/Time:  Saturday, February 3, 2018 18:40 - CONCLUSION:  1. 





 Postsurgical changes in the pelvis a gas bubble as described above within the 





 bladder anterior abdominal wall subcutaneous tissue and the pelvis all 





 postsurgical changes. No evidence for abscess. 2. Fatty liver.   MARGARITA Rodarte MD 


 


Lung Scan- Nuclear Medicine 2/3/18 0000 Signed





Impressions: 





 Service Date/Time:  Saturday, February 3, 2018 21:36 - CONCLUSION: Normal 





 examination.       MARGARITA Rodarte MD 


 


Chest X-Ray 2/3/18 0000 Signed





Impressions: 





 Service Date/Time:  Saturday, February 3, 2018 17:24 - CONCLUSION:  Slight 





 bibasilar atelectasis and/or infiltrate is seen worse on the left.     MARGARITA Rodarte MD 








Objective Remarks


GENERAL: This is a well-nourished, well-developed patient, in no apparent 

distress.


CARDIOVASCULAR: Regular rate and rhythm


RESPIRATORY: Clear to auscultation. Breath sounds equal bilaterally. 


GASTROINTESTINAL: Abdomen soft, non-tender, nondistended. Normal active bowel 

sounds


MUSCULOSKELETAL: Extremities without clubbing, cyanosis, or edema.


NEURO:  Alert & Oriented x4 to person, place, time, situation.  Moves all ext x4





A/P


Problem List:  


(1) Postoperative fever


ICD Codes:  R50.82 - Postprocedural fever


Plan:  Postoperative fever


This is a 62-year-old female patient with past medical history which includes 

hypertension, pulmonary embolism, DM type 2 diet controlled, anxiety/depression 

and endometrial adenocarcinoma.  Patient is status post robotic-assisted 

laparoscopic hysterectomy, bilateral oophorectomy on 2/1/2018 with Dr. Sanchez.  

Given patient's history of pulmonary embolism she was discharged on Lovenox 40 

mg subcutaneous 7 days.  Patient presented to the ER 2/3/2018 for evaluation 

of fever 100.9.  Patient endorses abdominal pain 7 out of 10 sharp and aching 

without radiation.  She reports gas pain and has not had a bowel movement since 

she had the surgery done.





- CT abd/Pelvis reviewed Postsurgical changes in the pelvis a gas bubble within 

the bladder anterior abdominal wall subcutaneous tissue and the pelvis all 

postsurgical changes.  No evidence for abscess.  Fatty liver


- D dimmer 2.28 likely reactive post-op


- VQ lung scan Normal exam


- CXR: Slight bibasilar atelectasis and/or infiltrate is seen worse on the left


- ER started IV Rocephin and azithromycin, continued


- BC x2 obtained NGTD


- Flu A and B neg


- Urine analysis reveals: Mod Leukocyte esterase, rare urine bacteria and few 

mucus. 


- continue Rocephin await culture results


- patient has been a febrile since admission





Hypoxia


- Patient had episode of Pulse oxygen on RA 88%, resolved on oxygen


- titrate oxygen to maintain saturation > or equal to 92%


- patient now on RA


- encouraged IS every our while wake





HTN (hypertension)


- Continue patient's home amlodipine 5 mg daily





Anxiety and depression


- Continue patient's home Fluoxetine 20 mg PO daily





Hx of pulmonary embolus


- Patient has a history of PE and is now S/P TAHBSO


- patient started on Lovenox 40mg SQ daily


- continue Lovenox 40mg SQ daily for DVT/PE prophylaxis





Discussed the case with Mar PAULA with Dr. Sanchez, who states patient is 

feeling much better and cleared for DC from GYN surgery prospective.


Plan to DC home with HHC and PT today in stable condition, patient to follow up 

with Dr. Sanchez in 1 week


Patient has received three days of Rocephin for possible UTI


Feel that patient has atelectasis not true pna no further abx, encourage IS





(2) Hypoxia


ICD Codes:  R09.02 - Hypoxemia


Status:  Acute


(3) HTN (hypertension)


ICD Codes:  I10 - Essential (primary) hypertension


(4) Anxiety and depression


ICD Codes:  F41.8 - Other specified anxiety disorders


(5) Hx of pulmonary embolus


ICD Codes:  Z86.711 - Personal history of pulmonary embolism


Assessment and Plan


Patient examined.


Assessment and plan formulated with Ara Milan PA-C.


I agree with the above.Patient examined.


Pt feels well. no fever. no sob. wants to go home. f/u Ara Ponce Feb 5, 2018 08:27


Rudolph Leonard MD Feb 5, 2018 10:30

## 2018-02-05 NOTE — RADRPT
EXAM DATE/TIME:  02/05/2018 09:46 

 

[facility] COMPARISON:     

No previous studies available for comparison.

 

                     

INDICATIONS :     

Shortness of breath.

                     

 

MEDICAL HISTORY :     

Hypertension.  Diabetes mellitus type II.        

 

SURGICAL HISTORY :     

Hysterectomy.   

 

ENCOUNTER:     

Initial                                        

 

ACUITY:     

1 day      

 

PAIN SCORE:     

0/10

 

LOCATION:     

Bilateral chest 

 

FINDINGS:     

PA and lateral views of the chest demonstrate the lungs to be symmetrically aerated without evidence 
of mass, infiltrate or effusion.  The cardiomediastinal contours are unremarkable.  Mild degenerative
 changes thoracic spine.

 

CONCLUSION:     No acute disease.  

 

 

 

 David Zimmerman MD FACR on February 05, 2018 at 10:11           

Board Certified Radiologist.

 This report was verified electronically.

## 2018-02-05 NOTE — HHI.FF
Face to Face Verification


Diagnosis:  


(1) Hypoxia


(2) Anxiety and depression


(3) Postoperative fever


Physical Therapy


Order:  Evaluate and Treat, Improve ambulation, Strength and gait training





Home Health Nursing








Order: Medical education





 Medication education-adverse effect





 Nursing assessment with vital signs

















I have seen patient Juli Vitale on 2/5/18. My clinical findings support 

the need for the requested home health care services because:








 High risk of falls














I certify that my clinical findings support that this patient is homebound 

because:








 Unsteady gait/balance

















Ara Milan Feb 5, 2018 11:02


Rudolph Leonard MD Feb 5, 2018 13:24